# Patient Record
Sex: FEMALE | Race: ASIAN | URBAN - METROPOLITAN AREA
[De-identification: names, ages, dates, MRNs, and addresses within clinical notes are randomized per-mention and may not be internally consistent; named-entity substitution may affect disease eponyms.]

---

## 2022-09-01 ENCOUNTER — APPOINTMENT (RX ONLY)
Dept: URBAN - METROPOLITAN AREA CLINIC 41 | Facility: CLINIC | Age: 57
Setting detail: DERMATOLOGY
End: 2022-09-01

## 2022-09-01 DIAGNOSIS — I82.40 ACUTE EMBOLISM AND THROMBOSIS OF UNSPECIFIED DEEP VEINS OF LOWER EXTREMITY: ICD-10-CM | Status: INADEQUATELY CONTROLLED

## 2022-09-01 PROBLEM — I82.409 ACUTE EMBOLISM AND THROMBOSIS OF UNSPECIFIED DEEP VEINS OF UNSPECIFIED LOWER EXTREMITY: Status: ACTIVE | Noted: 2022-09-01

## 2022-09-01 PROCEDURE — ? COUNSELING

## 2022-09-01 PROCEDURE — ? ADDITIONAL NOTES

## 2022-09-01 PROCEDURE — 99202 OFFICE O/P NEW SF 15 MIN: CPT

## 2022-09-01 NOTE — PROCEDURE: COUNSELING
Patient Specific Counseling (Will Not Stick From Patient To Patient): -\\nNG counsels pt get evaluated for blood clots in the leg via an ultrasound given swelling of the ankle and foot. NG recommends pt pursue the referral for an ultrasound and MRI from her PCP as soon as possible. NG explains if pt cannot schedule an ultrasound appt today then pt should go to the ER to rule out the blood clot as a possibility.\\n\\nNG notes Marty’s sign positive which is indicative of a blood clot.
Detail Level: Detailed

## 2022-09-01 NOTE — HPI: RASH
Is This A New Presentation, Or A Follow-Up?: Rash
Additional History: New pt presents with red swollen rash on pretibial left leg\\nPt states skin around rash is hard\\nPt states rash is itchy and painful \\nPt has applied Chinese itch cream, not successful \\nNo family history of skin disease\\nPt states having difficulty walking due to rash